# Patient Record
Sex: FEMALE | Race: WHITE | NOT HISPANIC OR LATINO | URBAN - NONMETROPOLITAN AREA
[De-identification: names, ages, dates, MRNs, and addresses within clinical notes are randomized per-mention and may not be internally consistent; named-entity substitution may affect disease eponyms.]

---

## 2021-11-04 ENCOUNTER — OFFICE VISIT (OUTPATIENT)
Dept: FAMILY MEDICINE | Facility: CLINIC | Age: 21
End: 2021-11-04

## 2021-11-04 VITALS — HEIGHT: 63 IN | RESPIRATION RATE: 18 BRPM | BODY MASS INDEX: 26.58 KG/M2 | WEIGHT: 150 LBS | TEMPERATURE: 97 F

## 2021-11-04 DIAGNOSIS — J45.909 ASTHMA, UNSPECIFIED ASTHMA SEVERITY, UNSPECIFIED WHETHER COMPLICATED, UNSPECIFIED WHETHER PERSISTENT: Primary | ICD-10-CM

## 2021-11-04 PROCEDURE — 99214 PR OFFICE/OUTPT VISIT, EST, LEVL IV, 30-39 MIN: ICD-10-PCS | Mod: ,,, | Performed by: NURSE PRACTITIONER

## 2021-11-04 PROCEDURE — 99214 OFFICE O/P EST MOD 30 MIN: CPT | Mod: ,,, | Performed by: NURSE PRACTITIONER

## 2021-11-04 RX ORDER — ALBUTEROL SULFATE 90 UG/1
2 AEROSOL, METERED RESPIRATORY (INHALATION) EVERY 6 HOURS PRN
Qty: 18 G | Refills: 3 | Status: SHIPPED | OUTPATIENT
Start: 2021-11-04 | End: 2023-08-08

## 2021-11-04 RX ORDER — ALBUTEROL SULFATE 1.25 MG/3ML
1.25 SOLUTION RESPIRATORY (INHALATION) EVERY 6 HOURS PRN
COMMUNITY
End: 2021-11-05 | Stop reason: CLARIF

## 2021-11-04 RX ORDER — MOMETASONE FUROATE AND FORMOTEROL FUMARATE DIHYDRATE 200; 5 UG/1; UG/1
2 AEROSOL RESPIRATORY (INHALATION) 2 TIMES DAILY
Qty: 13 G | Refills: 3 | Status: SHIPPED | OUTPATIENT
Start: 2021-11-04 | End: 2023-08-08

## 2021-11-04 RX ORDER — ALBUTEROL SULFATE 1.25 MG/3ML
1.25 SOLUTION RESPIRATORY (INHALATION) EVERY 6 HOURS PRN
Qty: 75 ML | Refills: 2 | Status: CANCELLED | OUTPATIENT
Start: 2021-11-04

## 2021-11-04 RX ORDER — ALBUTEROL SULFATE 90 UG/1
2 AEROSOL, METERED RESPIRATORY (INHALATION) EVERY 6 HOURS PRN
COMMUNITY
End: 2021-11-04 | Stop reason: SDUPTHER

## 2021-11-04 RX ORDER — MOMETASONE FUROATE AND FORMOTEROL FUMARATE DIHYDRATE 200; 5 UG/1; UG/1
2 AEROSOL RESPIRATORY (INHALATION) 2 TIMES DAILY
COMMUNITY
End: 2021-11-04 | Stop reason: SDUPTHER

## 2023-08-08 ENCOUNTER — OFFICE VISIT (OUTPATIENT)
Dept: FAMILY MEDICINE | Facility: CLINIC | Age: 23
End: 2023-08-08
Payer: COMMERCIAL

## 2023-08-08 VITALS
TEMPERATURE: 99 F | HEIGHT: 63 IN | OXYGEN SATURATION: 98 % | DIASTOLIC BLOOD PRESSURE: 85 MMHG | BODY MASS INDEX: 29.41 KG/M2 | RESPIRATION RATE: 18 BRPM | SYSTOLIC BLOOD PRESSURE: 126 MMHG | WEIGHT: 166 LBS | HEART RATE: 83 BPM

## 2023-08-08 DIAGNOSIS — Z30.016 ENCOUNTER FOR INITIAL PRESCRIPTION OF TRANSDERMAL PATCH HORMONAL CONTRACEPTIVE DEVICE: Primary | ICD-10-CM

## 2023-08-08 DIAGNOSIS — J01.00 ACUTE NON-RECURRENT MAXILLARY SINUSITIS: ICD-10-CM

## 2023-08-08 PROCEDURE — 1160F PR REVIEW ALL MEDS BY PRESCRIBER/CLIN PHARMACIST DOCUMENTED: ICD-10-PCS | Mod: ,,,

## 2023-08-08 PROCEDURE — 99213 OFFICE O/P EST LOW 20 MIN: CPT | Mod: ,,,

## 2023-08-08 PROCEDURE — 3079F PR MOST RECENT DIASTOLIC BLOOD PRESSURE 80-89 MM HG: ICD-10-PCS | Mod: ,,,

## 2023-08-08 PROCEDURE — 1160F RVW MEDS BY RX/DR IN RCRD: CPT | Mod: ,,,

## 2023-08-08 PROCEDURE — 3074F PR MOST RECENT SYSTOLIC BLOOD PRESSURE < 130 MM HG: ICD-10-PCS | Mod: ,,,

## 2023-08-08 PROCEDURE — 1159F MED LIST DOCD IN RCRD: CPT | Mod: ,,,

## 2023-08-08 PROCEDURE — 99213 PR OFFICE/OUTPT VISIT, EST, LEVL III, 20-29 MIN: ICD-10-PCS | Mod: ,,,

## 2023-08-08 PROCEDURE — 81025 URINE PREGNANCY TEST: CPT | Mod: QW,,,

## 2023-08-08 PROCEDURE — 81025 POCT URINE PREGNANCY: ICD-10-PCS | Mod: QW,,,

## 2023-08-08 PROCEDURE — 3079F DIAST BP 80-89 MM HG: CPT | Mod: ,,,

## 2023-08-08 PROCEDURE — 3008F PR BODY MASS INDEX (BMI) DOCUMENTED: ICD-10-PCS | Mod: ,,,

## 2023-08-08 PROCEDURE — 1159F PR MEDICATION LIST DOCUMENTED IN MEDICAL RECORD: ICD-10-PCS | Mod: ,,,

## 2023-08-08 PROCEDURE — 3074F SYST BP LT 130 MM HG: CPT | Mod: ,,,

## 2023-08-08 PROCEDURE — 3008F BODY MASS INDEX DOCD: CPT | Mod: ,,,

## 2023-08-08 RX ORDER — AZITHROMYCIN 250 MG/1
TABLET, FILM COATED ORAL
Qty: 6 TABLET | Refills: 0 | Status: SHIPPED | OUTPATIENT
Start: 2023-08-08 | End: 2023-08-13

## 2023-08-08 RX ORDER — NORELGESTROMIN AND ETHINYL ESTRADIOL 150; 35 UG/D; UG/D
1 PATCH TRANSDERMAL WEEKLY
Qty: 4 PATCH | Refills: 11 | Status: SHIPPED | OUTPATIENT
Start: 2023-08-08 | End: 2024-08-07

## 2023-08-08 NOTE — ASSESSMENT & PLAN NOTE
Xulane Patch RX today-Medication instructions and education given with understanding voiced. Pregnancy test negative in clinic today. Follow up 3 months.

## 2023-08-08 NOTE — PROGRESS NOTES
NORMAN MALDONADO   [place] [address]  [place]  [address]      PATIENT NAME: Dee Dee Romero  : 2000  DATE: 23  MRN: [id number]      Billing Provider: NORMAN MALDONADO  Level of Service: [place] OFFICE/OUTPT VISIT, NEWMADI III, 30-44 MIN  Patient PCP Information       Provider PCP Type    NORMAN MALDONADO General            Reason for Visit / Chief Complaint: Sinus Problem (X 1 week) and Contraception (Pt is requesting to start taking the birth control patch. )         History of Present Illness / Problem Focused Workflow     Dee Dee Romero presents to the clinic with Sinus Problem (X 1 week) and Contraception (Pt is requesting to start taking the birth control patch. )     23 y/o female presents to clinic with complaints of congestion and sinus pressure for the last week. She denies any chest pain, SOB, palpitations, fever, chills, HA, sore throat, cough, GI symptoms. Pt is also requesting to be started on Xulane patch for birth control.         Review of Systems     @Review of Systems   Constitutional:  Negative for chills, fatigue and fever.   HENT:  Positive for sinus pressure/congestion. Negative for nasal congestion, ear pain and sore throat.    Eyes:  Negative for pain.   Respiratory:  Negative for cough, chest tightness, shortness of breath and wheezing.    Cardiovascular:  Negative for chest pain and palpitations.   Gastrointestinal:  Negative for abdominal pain, nausea and vomiting.   Musculoskeletal:  Negative for arthralgias, back pain, joint swelling, myalgias and neck pain.   Neurological:  Negative for dizziness, seizures, weakness, light-headedness, numbness and headaches.   Psychiatric/Behavioral:  Negative for suicidal ideas.        Medical / Social / Family History     Past Medical History:   Diagnosis Date    Arthritis        Past Surgical History:   Procedure Laterality Date    WISDOM TOOTH EXTRACTION         Social History  MsRhianna  reports  that she has never smoked. She has never been exposed to tobacco smoke. She has never used smokeless tobacco. She reports that she does not drink alcohol and does not use drugs.    Family History  Ms.'s family history is not on file.    Medications and Allergies     Medications  No outpatient medications have been marked as taking for the 8/8/23 encounter (Office Visit) with Genaro Lagos FNP.       Allergies  Review of patient's allergies indicates:   Allergen Reactions    Omnicef [cefdinir]     Tamiflu [oseltamivir]        Physical Examination     Vitals:    08/08/23 1320   BP: 126/85   Pulse: 83   Resp: 18   Temp: 98.6 °F (37 °C)     Physical Exam  Vitals and nursing note reviewed.   Constitutional:       General: She is not in acute distress.     Appearance: Normal appearance.   HENT:      Head: Normocephalic.      Right Ear: Tympanic membrane, ear canal and external ear normal.      Left Ear: Tympanic membrane, ear canal and external ear normal.      Nose:      Right Sinus: Maxillary sinus tenderness present. No frontal sinus tenderness.      Left Sinus: Maxillary sinus tenderness present. No frontal sinus tenderness.      Mouth/Throat:      Lips: Pink.      Mouth: Mucous membranes are moist.      Pharynx: Oropharynx is clear. Uvula midline.   Eyes:      Pupils: Pupils are equal, round, and reactive to light.   Cardiovascular:      Rate and Rhythm: Normal rate and regular rhythm.      Heart sounds: Normal heart sounds, S1 normal and S2 normal. No murmur heard.     No friction rub. No gallop.   Pulmonary:      Effort: Pulmonary effort is normal. No respiratory distress.      Breath sounds: Normal breath sounds. No decreased breath sounds, wheezing, rhonchi or rales.   Abdominal:      General: Bowel sounds are normal.      Palpations: Abdomen is soft.   Musculoskeletal:         General: No swelling or tenderness. Normal range of motion.      Cervical back: Normal range of motion.   Lymphadenopathy:       "Cervical: No cervical adenopathy.   Skin:     General: Skin is warm and dry.      Capillary Refill: Capillary refill takes less than 2 seconds.   Neurological:      Mental Status: She is alert and oriented to person, place, and time.   Psychiatric:         Attention and Perception: Attention normal.         Mood and Affect: Mood normal.         Behavior: Behavior normal. Behavior is cooperative.         Thought Content: Thought content does not include homicidal or suicidal ideation.               No results found for: "WBC", "HGB", "HCT", "MCV", "PLT"     CMP  No results found for: "NA", "K", "CL", "CO2", "GLU", "BUN", "CREATININE", "CALCIUM", "PROT", "ALBUMIN", "BILITOT", "ALKPHOS", "AST", "ALT", "ANIONGAP", "EGFRNORACEVR"  Procedures   Assessment and Plan (including Health Maintenance)   :    Plan:           Problem List Items Addressed This Visit          ENT    Acute non-recurrent maxillary sinusitis    Current Assessment & Plan      Azithromycin RX today. Medication instructions and education given with understanding voiced. OTC antihistamines, decongestants, Ibuprofen/Tylenol as needed. Rest, warm compress as needed. RTC with worsening, new or persistent symptoms.          Relevant Medications    azithromycin (Z-ROBE) 250 MG tablet       Renal/    Encounter for initial prescription of transdermal patch hormonal contraceptive device - Primary    Current Assessment & Plan     Xulane Patch RX today-Medication instructions and education given with understanding voiced. Pregnancy test negative in clinic today. Follow up 3 months.          Relevant Medications    norelgestromin-ethinyl estradiol (XULANE) 150-35 mcg/24 hr    Other Relevant Orders    POCT urine pregnancy       Health Maintenance Topics with due status: Not Due       Topic Last Completion Date    Influenza Vaccine Not Due       No future appointments.     Health Maintenance Due   Topic Date Due    Hepatitis C Screening  Never done    Lipid Panel  " Never done    COVID-19 Vaccine (1) Never done    HPV Vaccines (1 - 2-dose series) Never done    HIV Screening  Never done    Chlamydia Screening  Never done    TETANUS VACCINE  Never done    Pap Smear  Never done        Follow up in about 3 months (around 11/8/2023).     Signature:  MACARIO GRIFFITH [facility]  [address] [address] [place]  [address]    Date of encounter: 8/8/23

## 2023-08-08 NOTE — ASSESSMENT & PLAN NOTE
Azithromycin RX today. Medication instructions and education given with understanding voiced. OTC antihistamines, decongestants, Ibuprofen/Tylenol as needed. Rest, warm compress as needed. RTC with worsening, new or persistent symptoms.

## 2023-10-30 LAB
B-HCG UR QL: NEGATIVE
CTP QC/QA: YES

## 2023-11-13 PROBLEM — J01.00 ACUTE NON-RECURRENT MAXILLARY SINUSITIS: Status: RESOLVED | Noted: 2023-08-08 | Resolved: 2023-11-13

## 2024-07-05 ENCOUNTER — OFFICE VISIT (OUTPATIENT)
Dept: FAMILY MEDICINE | Facility: CLINIC | Age: 24
End: 2024-07-05
Payer: COMMERCIAL

## 2024-07-05 VITALS
SYSTOLIC BLOOD PRESSURE: 128 MMHG | TEMPERATURE: 98 F | OXYGEN SATURATION: 98 % | WEIGHT: 179 LBS | BODY MASS INDEX: 31.71 KG/M2 | DIASTOLIC BLOOD PRESSURE: 92 MMHG | RESPIRATION RATE: 20 BRPM | HEART RATE: 85 BPM | HEIGHT: 63 IN

## 2024-07-05 DIAGNOSIS — F32.A ANXIETY AND DEPRESSION: Primary | ICD-10-CM

## 2024-07-05 DIAGNOSIS — F41.9 ANXIETY AND DEPRESSION: Primary | ICD-10-CM

## 2024-07-05 PROCEDURE — 3074F SYST BP LT 130 MM HG: CPT | Mod: CPTII,,,

## 2024-07-05 PROCEDURE — 3008F BODY MASS INDEX DOCD: CPT | Mod: CPTII,,,

## 2024-07-05 PROCEDURE — 3080F DIAST BP >= 90 MM HG: CPT | Mod: CPTII,,,

## 2024-07-05 PROCEDURE — 1159F MED LIST DOCD IN RCRD: CPT | Mod: CPTII,,,

## 2024-07-05 PROCEDURE — 99213 OFFICE O/P EST LOW 20 MIN: CPT | Mod: ,,,

## 2024-07-05 PROCEDURE — 1160F RVW MEDS BY RX/DR IN RCRD: CPT | Mod: CPTII,,,

## 2024-07-05 RX ORDER — HYDROXYZINE PAMOATE 25 MG/1
25 CAPSULE ORAL EVERY 8 HOURS PRN
Qty: 45 CAPSULE | Refills: 0 | Status: SHIPPED | OUTPATIENT
Start: 2024-07-05

## 2024-07-05 RX ORDER — SERTRALINE HYDROCHLORIDE 25 MG/1
25 TABLET, FILM COATED ORAL DAILY
Qty: 30 TABLET | Refills: 0 | Status: SHIPPED | OUTPATIENT
Start: 2024-07-05

## 2024-07-05 NOTE — LETTER
July 5, 2024      [facility]  [address] [place] [id number]  Phone: [phone]  Fax: [phone]       Patient: Dee Dee Romero   YOB: 2000  Date of Visit: 07/05/2024    To Whom It May Concern:    Jakob Romero  was at [facility] on 07/05/2024. Dee Dee has a history of anxiety and panic attacks. She is currently being treated for this with medication. Please feel free to call with any questions or concerns.    Sincerely,    NORMAN Hall

## 2024-07-05 NOTE — PROGRESS NOTES
MACARIO GRIFFITH, NORMAN   [facility]  [address]  [place]  [address]      PATIENT NAME: Dee Dee Romero  : 2000  DATE: 24  MRN: [id number]           History of Present Illness / Problem Focused Workflow       Anxiety (Patient is a 23 year old female who presents to the clinic related to anxiety. Patient was recently  24.  Patient has had several episodes with anxiety last month, but admits she has been really stressed.  Patient went to get on the airplane in [place] to fly to [facility], became anxious, shaky, left arm pain/tingling, full blown panic attack. Patient was not able to get on plane.  Patient reports she used to be on lexapro & prozac, stopped taking due to the way it made her feel. ), Weight Gain (Patient reports she has gained at least 30 lbs in the last 6 months, also feels tired & sluggish.  She has also had a couple of wounds that didn't heal quickly.  Patients mother is diabetic, sisters or prediabetic.  Patient would like to discuss. ), and Asthma (Patient is requesting a rescue inhaler related to her asthma. )    Review of Systems     @Review of Systems   Constitutional:  Negative for chills, fatigue and fever.   HENT:  Negative for nasal congestion, ear discharge, ear pain, rhinorrhea, sinus pressure/congestion and sore throat.    Respiratory:  Negative for cough, chest tightness, shortness of breath, wheezing and stridor.    Cardiovascular:  Negative for palpitations and claudication.   Gastrointestinal:  Negative for abdominal pain, constipation, diarrhea, nausea, vomiting and reflux.   Genitourinary:  Negative for dysuria, flank pain, frequency, hematuria and urgency.   Musculoskeletal:  Negative for myalgias.   Neurological:  Negative for dizziness, weakness, light-headedness and headaches.   Psychiatric/Behavioral:  Negative for suicidal ideas. The patient is nervous/anxious.        Medical / Social / Family History     Past Medical  History:   Diagnosis Date    Arthritis     Asthma        Past Surgical History:   Procedure Laterality Date    WISDOM TOOTH EXTRACTION             Medications and Allergies     Medications  No outpatient medications have been marked as taking for the 7/5/24 encounter (Office Visit) with Genaro Lagos FNP.       Allergies  Review of patient's allergies indicates:   Allergen Reactions    Omnicef [cefdinir]     Tamiflu [oseltamivir]        Physical Examination     Vitals:    07/05/24 1517   BP: (!) 128/92   Pulse: 85   Resp: 20   Temp: 97.9 °F (36.6 °C)     Physical Exam  Vitals and nursing note reviewed.   Constitutional:       General: She is awake.      Appearance: Normal appearance.   HENT:      Head: Normocephalic.      Right Ear: Tympanic membrane, ear canal and external ear normal.      Left Ear: Tympanic membrane, ear canal and external ear normal.      Nose: Nose normal.      Mouth/Throat:      Lips: Pink.      Mouth: Mucous membranes are moist.      Pharynx: Oropharynx is clear. Uvula midline.   Cardiovascular:      Rate and Rhythm: Normal rate and regular rhythm.      Heart sounds: Normal heart sounds, S1 normal and S2 normal.   Pulmonary:      Effort: Pulmonary effort is normal. No respiratory distress.      Breath sounds: Normal breath sounds. No decreased breath sounds, wheezing, rhonchi or rales.   Abdominal:      General: Bowel sounds are normal.      Palpations: Abdomen is soft.      Tenderness: There is no abdominal tenderness.   Musculoskeletal:      Cervical back: Normal range of motion.   Skin:     General: Skin is warm.      Capillary Refill: Capillary refill takes less than 2 seconds.   Neurological:      Mental Status: She is alert and oriented to person, place, and time.   Psychiatric:         Thought Content: Thought content does not include homicidal or suicidal ideation. Thought content does not include homicidal or suicidal plan.               Procedures   Assessment and Plan (including  Health Maintenance)   :    Plan:     Problem List Items Addressed This Visit          Psychiatric    Anxiety and depression - Primary    Current Assessment & Plan     Vistaril prn and Zoloft RX today. Denies any thoughts of suicide or self harm.  Reviewed treatment plan and medication side effects/risk/benefits/directions on taking medications. Instructed patient it could take up to 3- 4 weeks before they see full benefit of medication. Patient denies suicidal or homicidal ideations. Should these symptoms develop, encouraged to proceed to the closest ER for additional evaluation and treatment. Instructed patient to return to clinic in 3-4 weeks to discuss effectiveness of drug and any side effects. Patient verbalized understanding of treatment plan and denies any questions.         Relevant Medications    sertraline (ZOLOFT) 25 MG tablet    hydrOXYzine pamoate (VISTARIL) 25 MG Cap       Health Maintenance Topics with due status: Not Due       Topic Last Completion Date    Influenza Vaccine Not Due       No future appointments.     Health Maintenance Due   Topic Date Due    Hepatitis C Screening  Never done    Lipid Panel  Never done    HIV Screening  Never done    Chlamydia Screening  Never done    HPV Vaccines (1 - 3-dose series) Never done    TETANUS VACCINE  Never done    Pap Smear  Never done    COVID-19 Vaccine (1 - 2023-24 season) Never done        Follow up if symptoms worsen or fail to improve.     Signature:  NORMAN MALDONADO  [facility]  [address] [address] [place]  [address]    Date of encounter: 7/5/24

## 2024-07-08 ENCOUNTER — PATIENT OUTREACH (OUTPATIENT)
Facility: HOSPITAL | Age: 24
End: 2024-07-08
Payer: COMMERCIAL

## 2024-07-08 NOTE — LETTER
AUTHORIZATION FOR RELEASE OF   CONFIDENTIAL INFORMATION    Dear ,    We are seeing Dee Dee Romero, date of birth 2000, in the clinic at [facility]. Genaro Lagos FNP is the patient's PCP. Dee Dee Romero has an outstanding lab/procedure at the time we reviewed her chart. In order to help keep her health information updated, she has authorized us to request the following medical record(s):        (  )  MAMMOGRAM                                      (  )  COLONOSCOPY      x  )  PAP SMEAR                                          ( x )  OUTSIDE LAB RESULTS     (  )  DEXA SCAN                                          (  )  EYE EXAM            (  )  FOOT EXAM                                          (  )  ENTIRE RECORD     (  )  OUTSIDE IMMUNIZATIONS                 (  )  _______________         Please fax records to [facility], [place], FNP, [phone]     If you have any questions, please contact Xenia Buckner at [phone].           Patient Name: Dee Dee Romero  : 2000  Patient Phone #: [phone]

## 2024-07-08 NOTE — LETTER
AUTHORIZATION FOR RELEASE OF   CONFIDENTIAL INFORMATION    Dear Dr. Sanon,    We are seeing Dee Dee Romero, date of birth 2000, in the clinic at [facility]. Genaro Lagos FNP is the patient's PCP. Dee Dee Romero has an outstanding lab/procedure at the time we reviewed her chart. In order to help keep her health information updated, she has authorized us to request the following medical record(s):        (  )  MAMMOGRAM                                      (  )  COLONOSCOPY      ( x )  PAP SMEAR                                          (x  )  OUTSIDE LAB RESULTS     (  )  DEXA SCAN                                          (  )  EYE EXAM            (  )  FOOT EXAM                                          (  )  ENTIRE RECORD     (  )  OUTSIDE IMMUNIZATIONS                 (  )  _______________         Please fax records to [facility], FNP, [phone]     If you have any questions, please contact Xenia Buckner at [phone].           Patient Name: Dee Dee Romero  : 2000  Patient Phone #: [phone]

## 2024-07-08 NOTE — LETTER
AUTHORIZATION FOR RELEASE OF   CONFIDENTIAL INFORMATION    Dear [place] office,    We are seeing Dee Dee Romero, date of birth 2000, in the clinic at [facility]. Genaro Lagos FNP is the patient's PCP. Dee Dee Romero has an outstanding lab/procedure at the time we reviewed her chart. In order to help keep her health information updated, she has authorized us to request the following medical record(s):        (  )  MAMMOGRAM                                      (  )  COLONOSCOPY      x  )  PAP SMEAR                                          x  )  OUTSIDE LAB RESULTS     (  )  DEXA SCAN                                          (  )  EYE EXAM            (  )  FOOT EXAM                                          (  )  ENTIRE RECORD     (  )  OUTSIDE IMMUNIZATIONS                 (  )  _______________         Please fax records to [facility], [facility], FNP, [phone]     If you have any questions, please contact Xenia Buckner at [phone].           Patient Name: Dee Dee Romero  : 2000  Patient Phone #: [phone]

## 2024-07-08 NOTE — LETTER
AUTHORIZATION FOR RELEASE OF   CONFIDENTIAL INFORMATION    Dear Sebastián,    We are seeing Dee Dee Romero, date of birth 2000, in the clinic at [facility]. Genaro Lagos FNP is the patient's PCP. Dee Dee Romero has an outstanding lab/procedure at the time we reviewed her chart. In order to help keep her health information updated, she has authorized us to request the following medical record(s):        (  )  MAMMOGRAM                                      (  )  COLONOSCOPY      ( x )  PAP SMEAR                                          ( x )  OUTSIDE LAB RESULTS         Please fax records to [facility], [facility], FNP, [phone]     If you have any questions, please [facility] at [phone].           Patient Name: Dee Dee Romero  : 2000  Patient Phone #: [phone]

## 2024-07-08 NOTE — PROGRESS NOTES

## 2024-07-24 PROBLEM — F41.9 ANXIETY AND DEPRESSION: Status: ACTIVE | Noted: 2024-07-24

## 2024-07-24 PROBLEM — F32.A ANXIETY AND DEPRESSION: Status: ACTIVE | Noted: 2024-07-24

## 2024-07-24 NOTE — ASSESSMENT & PLAN NOTE
Vistaril prn and Zoloft RX today. Denies any thoughts of suicide or self harm.  Reviewed treatment plan and medication side effects/risk/benefits/directions on taking medications. Instructed patient it could take up to 3- 4 weeks before they see full benefit of medication. Patient denies suicidal or homicidal ideations. Should these symptoms develop, encouraged to proceed to the closest ER for additional evaluation and treatment. Instructed patient to return to clinic in 3-4 weeks to discuss effectiveness of drug and any side effects. Patient verbalized understanding of treatment plan and denies any questions.

## 2024-10-01 ENCOUNTER — PATIENT OUTREACH (OUTPATIENT)
Facility: HOSPITAL | Age: 24
End: 2024-10-01
Payer: COMMERCIAL

## 2024-10-01 NOTE — PROGRESS NOTES
Population Health Chart Review & Patient Outreach Details    Updates Requested / Reviewed:  [x]  [facility] & [facility] Reviewed  [x]   Reviewed        Health Maintenance Topics Addressed and Outreach Outcomes / Actions Taken:  Chlamydia Screening  [x] No documentation found in [facility] or [facility] for Chlamydia Screening.      [x] Comment placed in chart that patient needs this. No upcoming appointment scheduled at this time.

## 2025-02-26 ENCOUNTER — PATIENT OUTREACH (OUTPATIENT)
Facility: HOSPITAL | Age: 25
End: 2025-02-26
Payer: COMMERCIAL

## 2025-02-26 NOTE — PROGRESS NOTES
Population Health Chart Review & Patient Outreach Details    Updates Requested / Reviewed:  [x]  Care Team Updated  [x]  Care Everywhere Updated & Reviewed  [x]  [facility] Reviewed  [x]   Reviewed      Health Maintenance Topics Addressed and Outreach Outcomes / Actions Taken:  Chlamydia Screening  [x] No documentation found in [facility], [facility], or Care Everywhere for Chlamydia Screening.      [x] Comment placed in chart that patient needs this. No upcoming appointment scheduled at this time.

## 2025-05-19 ENCOUNTER — PATIENT OUTREACH (OUTPATIENT)
Facility: HOSPITAL | Age: 25
End: 2025-05-19
Payer: COMMERCIAL

## 2025-05-19 NOTE — PROGRESS NOTES
Population Health Chart Review & Patient Outreach Details    Updates Requested / Reviewed:  [x]  Care Team Updated  [x]  Care Everywhere Updated & Reviewed  [x]  [facility] Reviewed  [x]   Reviewed    Health Maintenance Topics Addressed and Outreach Outcomes / Actions Taken:  Chlamydia Screening  [x] Care everywhere shows that PCP is Dr. Endy Slaughter [place]. Removed NORMAN Hall as PCP.